# Patient Record
Sex: FEMALE | Race: WHITE | NOT HISPANIC OR LATINO
[De-identification: names, ages, dates, MRNs, and addresses within clinical notes are randomized per-mention and may not be internally consistent; named-entity substitution may affect disease eponyms.]

---

## 2017-07-15 ENCOUNTER — TRANSCRIPTION ENCOUNTER (OUTPATIENT)
Age: 32
End: 2017-07-15

## 2017-12-14 ENCOUNTER — TRANSCRIPTION ENCOUNTER (OUTPATIENT)
Age: 32
End: 2017-12-14

## 2019-10-17 ENCOUNTER — EMERGENCY (EMERGENCY)
Facility: HOSPITAL | Age: 34
LOS: 0 days | Discharge: HOME | End: 2019-10-17
Attending: EMERGENCY MEDICINE | Admitting: EMERGENCY MEDICINE
Payer: COMMERCIAL

## 2019-10-17 VITALS
OXYGEN SATURATION: 100 % | HEART RATE: 75 BPM | RESPIRATION RATE: 18 BRPM | DIASTOLIC BLOOD PRESSURE: 80 MMHG | SYSTOLIC BLOOD PRESSURE: 155 MMHG

## 2019-10-17 VITALS
OXYGEN SATURATION: 100 % | HEART RATE: 81 BPM | DIASTOLIC BLOOD PRESSURE: 90 MMHG | HEIGHT: 62 IN | TEMPERATURE: 98 F | WEIGHT: 164.91 LBS | RESPIRATION RATE: 18 BRPM | SYSTOLIC BLOOD PRESSURE: 160 MMHG

## 2019-10-17 DIAGNOSIS — I10 ESSENTIAL (PRIMARY) HYPERTENSION: ICD-10-CM

## 2019-10-17 DIAGNOSIS — R00.2 PALPITATIONS: ICD-10-CM

## 2019-10-17 LAB
ANION GAP SERPL CALC-SCNC: 13 MMOL/L — SIGNIFICANT CHANGE UP (ref 7–14)
BASOPHILS # BLD AUTO: 0.04 K/UL — SIGNIFICANT CHANGE UP (ref 0–0.2)
BASOPHILS NFR BLD AUTO: 0.6 % — SIGNIFICANT CHANGE UP (ref 0–1)
BUN SERPL-MCNC: 10 MG/DL — SIGNIFICANT CHANGE UP (ref 10–20)
CALCIUM SERPL-MCNC: 9.8 MG/DL — SIGNIFICANT CHANGE UP (ref 8.5–10.1)
CHLORIDE SERPL-SCNC: 102 MMOL/L — SIGNIFICANT CHANGE UP (ref 98–110)
CO2 SERPL-SCNC: 25 MMOL/L — SIGNIFICANT CHANGE UP (ref 17–32)
CREAT SERPL-MCNC: 0.6 MG/DL — LOW (ref 0.7–1.5)
EOSINOPHIL # BLD AUTO: 0.19 K/UL — SIGNIFICANT CHANGE UP (ref 0–0.7)
EOSINOPHIL NFR BLD AUTO: 2.7 % — SIGNIFICANT CHANGE UP (ref 0–8)
GLUCOSE SERPL-MCNC: 108 MG/DL — HIGH (ref 70–99)
HCT VFR BLD CALC: 40.8 % — SIGNIFICANT CHANGE UP (ref 37–47)
HGB BLD-MCNC: 13.1 G/DL — SIGNIFICANT CHANGE UP (ref 12–16)
IMM GRANULOCYTES NFR BLD AUTO: 0.3 % — SIGNIFICANT CHANGE UP (ref 0.1–0.3)
LYMPHOCYTES # BLD AUTO: 2.11 K/UL — SIGNIFICANT CHANGE UP (ref 1.2–3.4)
LYMPHOCYTES # BLD AUTO: 30.1 % — SIGNIFICANT CHANGE UP (ref 20.5–51.1)
MCHC RBC-ENTMCNC: 29.5 PG — SIGNIFICANT CHANGE UP (ref 27–31)
MCHC RBC-ENTMCNC: 32.1 G/DL — SIGNIFICANT CHANGE UP (ref 32–37)
MCV RBC AUTO: 91.9 FL — SIGNIFICANT CHANGE UP (ref 81–99)
MONOCYTES # BLD AUTO: 0.68 K/UL — HIGH (ref 0.1–0.6)
MONOCYTES NFR BLD AUTO: 9.7 % — HIGH (ref 1.7–9.3)
NEUTROPHILS # BLD AUTO: 3.97 K/UL — SIGNIFICANT CHANGE UP (ref 1.4–6.5)
NEUTROPHILS NFR BLD AUTO: 56.6 % — SIGNIFICANT CHANGE UP (ref 42.2–75.2)
NRBC # BLD: 0 /100 WBCS — SIGNIFICANT CHANGE UP (ref 0–0)
PLATELET # BLD AUTO: 256 K/UL — SIGNIFICANT CHANGE UP (ref 130–400)
POTASSIUM SERPL-MCNC: 4.3 MMOL/L — SIGNIFICANT CHANGE UP (ref 3.5–5)
POTASSIUM SERPL-SCNC: 4.3 MMOL/L — SIGNIFICANT CHANGE UP (ref 3.5–5)
RBC # BLD: 4.44 M/UL — SIGNIFICANT CHANGE UP (ref 4.2–5.4)
RBC # FLD: 12.1 % — SIGNIFICANT CHANGE UP (ref 11.5–14.5)
SODIUM SERPL-SCNC: 140 MMOL/L — SIGNIFICANT CHANGE UP (ref 135–146)
WBC # BLD: 7.01 K/UL — SIGNIFICANT CHANGE UP (ref 4.8–10.8)
WBC # FLD AUTO: 7.01 K/UL — SIGNIFICANT CHANGE UP (ref 4.8–10.8)

## 2019-10-17 PROCEDURE — 93010 ELECTROCARDIOGRAM REPORT: CPT

## 2019-10-17 PROCEDURE — 99284 EMERGENCY DEPT VISIT MOD MDM: CPT

## 2019-10-17 NOTE — ED PROVIDER NOTE - PATIENT PORTAL LINK FT
You can access the FollowMyHealth Patient Portal offered by [facility] by registering at the following website: [email or web address]. By joining [employer]’s FollowMyHealth portal, you will also be able to view your health information using other applications (apps) compatible with our system.

## 2019-10-17 NOTE — ED PROVIDER NOTE - NSFOLLOWUPINSTRUCTIONS_ED_ALL_ED_FT
Hypertension    Hypertension, commonly called high blood pressure, is when the force of blood pumping through your arteries is too strong. Hypertension forces your heart to work harder to pump blood. Your arteries may become narrow or stiff. Having untreated or uncontrolled hypertension for a long period of time can cause heart attack, stroke, kidney disease, and other problems. If started on a medication, take exactly as prescribed by your health care professional. Maintain a healthy lifestyle and follow up with your primary care physician.    SEEK IMMEDIATE MEDICAL CARE IF YOU HAVE THE FOLLOWING SYMPTOMS:  confusion, chest pain, abdominal pain, vomiting, or shortness of breath.    Palpitations    A palpitation is the feeling that your heartbeat is irregular or is faster than normal. It may feel like your heart is fluttering or skipping a beat. They may be caused by many things, including smoking, caffeine, alcohol, stress, and certain medicines. Although most causes of palpitations are not serious, palpitations can be a sign of a serious medical problem. Avoid caffeine, alcohol, tobacco products at home. Try to reduce stress and anxiety, and make sure to get plenty of rest.     SEEK IMMEDIATE MEDICAL CARE IF YOU HAVE THE FOLLOWING SYMPTOMS: chest pain, shortness of breath, severe headache, or dizziness/lightheadedness.

## 2019-10-17 NOTE — ED ADULT NURSE NOTE - OBJECTIVE STATEMENT
Pt has had HTN for 3-4 days and palpitations once a night. Pt BP elevated 160/90 at triage. Denies headache, cp. sob.

## 2019-10-17 NOTE — ED PROVIDER NOTE - NS ED ROS FT
Constitutional: No fevers.   Eyes:  No visual changes, eye pain or discharge.  ENMT:  No hearing changes, pain, no sore throat or runny nose, no difficulty swallowing  Cardiac:  No chest pain, SOB or edema. +HTN. +palpitations.   Respiratory:  No cough or respiratory distress. No hemoptysis.   GI:  No nausea, vomiting, diarrhea or abdominal pain.  :  No dysuria, frequency or burning.  MS:  No myalgia, muscle weakness, joint pain or back pain.  Neuro:  No headache or weakness.  No LOC.  Skin:  No skin rash.   Endocrine: No history of thyroid disease or diabetes.

## 2019-10-17 NOTE — ED PROVIDER NOTE - OBJECTIVE STATEMENT
Patient is a 33 yo F w/ no pmh p/w hypertension. Patient states she has had episodes where she becomes flushed and her BP is elevated; prior to arrival BP was 160s systolic at home. Patient also states that for the last 4-5 days she has had brief episodes of palpitations lasting a few seconds before bed; denies chest pain, SOB, abdominal pain, headache, numbness/tingling. Denies fam hx of sudden death; both parents have HTN; no cardiac hx.

## 2019-10-17 NOTE — ED PROVIDER NOTE - PHYSICAL EXAMINATION
CONSTITUTIONAL: Well-developed; well-nourished; in no acute distress.   SKIN: warm, dry.  HEAD: Normocephalic; atraumatic.  EYES: PERRL, EOMI, no conjunctival erythema  ENT: No nasal discharge; airway clear.  NECK: Supple; non tender.  CARD: S1, S2 normal; no murmurs, gallops, or rubs. Regular rate and rhythm.   RESP: No wheezes, rales or rhonchi.  ABD: soft ntnd.  EXT: Normal ROM.  No clubbing, cyanosis or edema. Pulses 2+ in all four extremities.   NEURO: Alert, oriented, grossly unremarkable.  PSYCH: Cooperative, appropriate.

## 2019-10-17 NOTE — ED PROVIDER NOTE - CLINICAL SUMMARY MEDICAL DECISION MAKING FREE TEXT BOX
35yo F no significant past medical history presenting with intermittent palpitations x3-4d, at night while lying down for bed. Also noted to have HTN and has been tracking and following with Dr. Schulte for. No chest pain, no current palpitations. No f/c/n/v/d, abdominal pain, numbness/focal weakness, back pain. labs ekg reviewed. Comfortable with discharge and follow-up outpatient, strict return precautions given. Endorses understanding of all of this and aware that they can return at any time for new or concerning symptoms. No further questions or concerns at this time

## 2019-10-17 NOTE — ED PROVIDER NOTE - CARE PROVIDER_API CALL
Rodney Schulte (DO)  Infectious Disease; Internal Medicine  [address] [place] [address]  Phone: [phone]  Fax: [phone]  Follow Up Time:

## 2019-10-17 NOTE — ED PROVIDER NOTE - ATTENDING CONTRIBUTION TO CARE
33yo F no significant past medical history presenting with intermittent palpitations x3-4d, at night while lying down for bed. Also noted to have HTN and has been tracking and following with Dr. Schulte for. No chest pain, no current palpitations. No f/c/n/v/d, abdominal pain, numbness/focal weakness, back pain   Constitutional: Well appearing. No acute distress. Non toxic.   Eyes: PERRLA. Extraocular movements intact, no entrapment. Conjunctiva normal.   ENT: No nasal discharge. Moist mucus membranes.  Neck: Supple, non tender, full range of motion.  CV: RRR no murmurs, rubs, or gallops. +S1S2.   Pulm: Clear to auscultation bilaterally. Normal work of breathing.  Abd: soft NT ND +BS.   Ext: Warm and well perfused x4, moving all extremities, no edema.   Psy: Cooperative, appropriate.   Skin: Warm, dry, no rash  Neuro: CN2-12 grossly intact no sensory or motor deficits throughout, no drift, no ataxia, rapid alternating within normal limits

## 2019-10-18 ENCOUNTER — OUTPATIENT (OUTPATIENT)
Dept: OUTPATIENT SERVICES | Facility: HOSPITAL | Age: 34
LOS: 1 days | Discharge: HOME | End: 2019-10-18
Payer: COMMERCIAL

## 2019-10-18 DIAGNOSIS — Z12.31 ENCOUNTER FOR SCREENING MAMMOGRAM FOR MALIGNANT NEOPLASM OF BREAST: ICD-10-CM

## 2019-10-18 PROCEDURE — 77063 BREAST TOMOSYNTHESIS BI: CPT | Mod: 26

## 2019-10-18 PROCEDURE — 77067 SCR MAMMO BI INCL CAD: CPT | Mod: 26

## 2019-10-29 ENCOUNTER — OUTPATIENT (OUTPATIENT)
Dept: OUTPATIENT SERVICES | Facility: HOSPITAL | Age: 34
LOS: 1 days | Discharge: HOME | End: 2019-10-29
Payer: COMMERCIAL

## 2019-10-29 DIAGNOSIS — R92.8 OTHER ABNORMAL AND INCONCLUSIVE FINDINGS ON DIAGNOSTIC IMAGING OF BREAST: ICD-10-CM

## 2019-10-29 PROCEDURE — 77065 DX MAMMO INCL CAD UNI: CPT | Mod: 26,RT

## 2019-10-29 PROCEDURE — 76642 ULTRASOUND BREAST LIMITED: CPT | Mod: 26,RT

## 2019-10-29 PROCEDURE — 77061 BREAST TOMOSYNTHESIS UNI: CPT | Mod: 26,RT

## 2019-10-29 PROCEDURE — G0279: CPT | Mod: 26,RT

## 2020-11-24 ENCOUNTER — TRANSCRIPTION ENCOUNTER (OUTPATIENT)
Age: 35
End: 2020-11-24

## 2021-05-12 ENCOUNTER — OUTPATIENT (OUTPATIENT)
Dept: OUTPATIENT SERVICES | Facility: HOSPITAL | Age: 36
LOS: 1 days | Discharge: HOME | End: 2021-05-12
Payer: COMMERCIAL

## 2021-05-12 DIAGNOSIS — Z12.31 ENCOUNTER FOR SCREENING MAMMOGRAM FOR MALIGNANT NEOPLASM OF BREAST: ICD-10-CM

## 2021-05-12 PROCEDURE — 77063 BREAST TOMOSYNTHESIS BI: CPT | Mod: 26

## 2021-05-12 PROCEDURE — 77067 SCR MAMMO BI INCL CAD: CPT | Mod: 26

## 2021-11-30 PROBLEM — Z00.00 ENCOUNTER FOR PREVENTIVE HEALTH EXAMINATION: Status: ACTIVE | Noted: 2021-11-30

## 2021-12-23 ENCOUNTER — TRANSCRIPTION ENCOUNTER (OUTPATIENT)
Age: 36
End: 2021-12-23

## 2021-12-23 ENCOUNTER — APPOINTMENT (OUTPATIENT)
Dept: BREAST CENTER | Facility: CLINIC | Age: 36
End: 2021-12-23
Payer: COMMERCIAL

## 2021-12-23 VITALS
HEIGHT: 62 IN | TEMPERATURE: 96.8 F | DIASTOLIC BLOOD PRESSURE: 80 MMHG | BODY MASS INDEX: 29.08 KG/M2 | WEIGHT: 158 LBS | SYSTOLIC BLOOD PRESSURE: 134 MMHG

## 2021-12-23 DIAGNOSIS — Z80.0 FAMILY HISTORY OF MALIGNANT NEOPLASM OF DIGESTIVE ORGANS: ICD-10-CM

## 2021-12-23 DIAGNOSIS — Z78.9 OTHER SPECIFIED HEALTH STATUS: ICD-10-CM

## 2021-12-23 DIAGNOSIS — Z86.79 PERSONAL HISTORY OF OTHER DISEASES OF THE CIRCULATORY SYSTEM: ICD-10-CM

## 2021-12-23 DIAGNOSIS — Z80.8 FAMILY HISTORY OF MALIGNANT NEOPLASM OF OTHER ORGANS OR SYSTEMS: ICD-10-CM

## 2021-12-23 DIAGNOSIS — Z83.2 FAMILY HISTORY OF DISEASES OF THE BLOOD AND BLOOD-FORMING ORGANS AND CERTAIN DISORDERS INVOLVING THE IMMUNE MECHANISM: ICD-10-CM

## 2021-12-23 PROCEDURE — 99203 OFFICE O/P NEW LOW 30 MIN: CPT

## 2021-12-23 RX ORDER — HYDROCHLOROTHIAZIDE 12.5 MG/1
TABLET ORAL
Refills: 0 | Status: ACTIVE | COMMUNITY

## 2021-12-23 NOTE — ASSESSMENT
[FreeTextEntry1] : Carol Ann is 36 year old female here for initial evaluation; she is s/p B/L reduction mammoplasty procedure on 10/01/2020 - incidentally found focal ALH of the right breast on pathology.\par \par She has no breast related complaints at this time.  She denies any breast pain, has not palpated any new palpable masses in either breast and denies any nipple discharge or retraction.\par She has healed well from surgery.\par \par Her surgical pathology from reduction mammoplasty (10/01/2021):\par Left breast:\par -fragments of benign breast tissue with focally proliferative fibrocystic changes\par -nodular dense stromal fibrosis and focal usual ductal hyperplasia \par -adenosis, duct ectasia and cyst formations.\par Right breast:\par -focal atypical lobular hyperplasia ALH \par -focally proliferative fibrocystic changes with focal ductal hyperplasia \par -nodular dense stromal fibrosis, adenosis and cyst formation \par \par On physical exam, no palpable abnormalities - well healed surgical scars. \par \par In light of her new diagnosis of R breast ALH, her new risk assessment is as follows: \par Ena Model Risk Assessment:\par 5 yr - 1.4%\par Life - 24.9%\par \par Tyrer-Cuzick Risk Assessment (v6):\par 5 yr - 1.9%\par Life - 36.5%\par \par Her pathology results were reviewed.  We discussed atypical lobular hyperplasia (ALH).  These are considered benign high risk lesions. The recommendation is for surgical excision or lumpectomy - which has already been performed.  Patients with these lesions were found to have an increased relative risk of breast cancer, up to four fold compared to the reference population. However the lesions themselves do not have any malignant potential. The upgrade rate to cancer ranges from 0-10% on surgical excision, with pathologic/radiologic concordance, however older studies have documented up to a 60% upgrade rate with lobular neoplasia, including ALH. \par \par This puts her in the high-risk category for breast cancer because she has a lifetime risk greater than 20%.  She qualifies for annual screening MRI, which would be done in an alternating fashion with her screening mammogram such that an imaging study and clinical breast exam would be performed every six months.  The use of MRI has not been shown to prolong survival, however, out of 1000 women screened, an additional 14-15 cancers will be identified.  The use of MRI has, however, been shown to increase the number of procedures and additional imaging because although it is a very sensitive test, it is not as specific.  This was discussed with the patient and she would like to proceed with screening MRI.  She is in agreement with this plan.\par \par PLAN:\par -B/L Dx Mammo - April 2022.\par -f/u after.\par -Plan for possible B/L Breast MRI in October 2022 for high risk screen.

## 2021-12-23 NOTE — DATA REVIEWED
[FreeTextEntry1] : Her surgical pathology from reduction mammoplasty:\par Left breast:\par -fragments of benign breast tissue with focally proliferative fibrocystic changes\par -nodular dense stromal fibrosis and focal usual ductal hyperplasia \par -adenosis, duct ectasia and cyst formations.\par Right breast:\par -focal atypical lobular hyperplasia ALH \par -focally proliferative fibrocystic changes with focal ductal hyperplasia \par -nodular dense stromal fibrosis, adenosis and cyst formation \par

## 2021-12-23 NOTE — PAST MEDICAL HISTORY
[Menstruating] : The patient is menstruating [Menarche Age ____] : age at menarche was [unfilled] [Total Preg ___] : G[unfilled] [Live Births ___] : P[unfilled]  [Age At Live Birth ___] : Age at live birth: [unfilled] [FreeTextEntry6] : No. [FreeTextEntry7] : Yes; approx 15 years. [FreeTextEntry8] : No.

## 2021-12-23 NOTE — PHYSICAL EXAM
[Normocephalic] : normocephalic [Atraumatic] : atraumatic [No Supraclavicular Adenopathy] : no supraclavicular adenopathy [No Cervical Adenopathy] : no cervical adenopathy [Examined in the supine and seated position] : examined in the supine and seated position [No dominant masses] : no dominant masses in right breast  [No dominant masses] : no dominant masses left breast [No Nipple Discharge] : no left nipple discharge [Breast Nipple Inversion] : nipples not inverted [Breast Nipple Retraction] : nipples not retracted [No Axillary Lymphadenopathy] : no left axillary lymphadenopathy [No Rashes] : no rashes [No Ulceration] : no ulceration [de-identified] : well healed surgical scars.

## 2021-12-23 NOTE — REVIEW OF SYSTEMS
[Breast Pain] : no breast pain [Breast Lump] : no breast lump [Nipple Discharge] : no nipple discharge [Nipple Inverted] : no inversion of the nipple [Negative] : Heme/Lymph

## 2021-12-23 NOTE — HISTORY OF PRESENT ILLNESS
[FreeTextEntry1] : Carol Ann is 36 year old female here for initial evaluation; she is s/p B/L reduction mammoplasty procedure on 10/01/2020 - incidentally found focal ALH of the right breast on pathology.\par \par She has no breast related complaints at this time.  She denies any breast pain, has not palpated any new palpable masses in either breast and denies any nipple discharge or retraction.\par She has healed well from surgery.\par \par Her surgical pathology from reduction mammoplasty (10/01/2021):\par Left breast:\par -fragments of benign breast tissue with focally proliferative fibrocystic changes\par -nodular dense stromal fibrosis and focal usual ductal hyperplasia \par -adenosis, duct ectasia and cyst formations.\par Right breast:\par -focal atypical lobular hyperplasia ALH \par -focally proliferative fibrocystic changes with focal ductal hyperplasia \par -nodular dense stromal fibrosis, adenosis and cyst formation \par \par \par HISTORICAL RISK FACTORS:\par -s/p B/L reduction mammoplasty procedure on 10/01/2020.\par -no family hx of breast / ovarian cancer.\par -, age at first live birth was 28.\par -prior OCP use x approx 15 yrs.\par -no gyn surgeries\par \par Ena Model Risk Assessment:\par 5 yr - 1.4%\par Life - 24.9%\par \par Tyrer-Cuzick Risk Assessment (v6):\par 5 yr - 1.9%\par Life - 36.5%\par

## 2022-04-04 ENCOUNTER — RESULT REVIEW (OUTPATIENT)
Age: 37
End: 2022-04-04

## 2022-04-04 ENCOUNTER — OUTPATIENT (OUTPATIENT)
Dept: OUTPATIENT SERVICES | Facility: HOSPITAL | Age: 37
LOS: 1 days | Discharge: HOME | End: 2022-04-04
Payer: COMMERCIAL

## 2022-04-04 DIAGNOSIS — R92.8 OTHER ABNORMAL AND INCONCLUSIVE FINDINGS ON DIAGNOSTIC IMAGING OF BREAST: ICD-10-CM

## 2022-04-04 PROCEDURE — G0279: CPT | Mod: 26

## 2022-04-04 PROCEDURE — 77062 BREAST TOMOSYNTHESIS BI: CPT | Mod: 26

## 2022-04-04 PROCEDURE — 77066 DX MAMMO INCL CAD BI: CPT | Mod: 26

## 2022-04-14 ENCOUNTER — NON-APPOINTMENT (OUTPATIENT)
Age: 37
End: 2022-04-14

## 2022-04-14 ENCOUNTER — APPOINTMENT (OUTPATIENT)
Dept: BREAST CENTER | Facility: CLINIC | Age: 37
End: 2022-04-14
Payer: COMMERCIAL

## 2022-04-14 VITALS
SYSTOLIC BLOOD PRESSURE: 152 MMHG | WEIGHT: 158 LBS | TEMPERATURE: 97.2 F | DIASTOLIC BLOOD PRESSURE: 84 MMHG | BODY MASS INDEX: 29.08 KG/M2 | HEIGHT: 62 IN

## 2022-04-14 PROCEDURE — 99212 OFFICE O/P EST SF 10 MIN: CPT

## 2022-04-14 NOTE — ASSESSMENT
[FreeTextEntry1] : Carol Ann is 36 year old female here for initial evaluation; she is s/p B/L reduction mammoplasty procedure on 10/01/2020 - incidentally found focal ALH of the right breast on pathology.\par \par She has no breast related complaints at this time.  She denies any breast pain, has not palpated any new palpable masses in either breast and denies any nipple discharge or retraction.\par She has healed well from surgery.\par \par Her surgical pathology from reduction mammoplasty (10/01/2021):\par Left breast:\par -fragments of benign breast tissue with focally proliferative fibrocystic changes\par -nodular dense stromal fibrosis and focal usual ductal hyperplasia \par -adenosis, duct ectasia and cyst formations.\par Right breast:\par -focal atypical lobular hyperplasia ALH \par -focally proliferative fibrocystic changes with focal ductal hyperplasia \par -nodular dense stromal fibrosis, adenosis and cyst formation \par \par On physical exam, no palpable abnormalities - well healed surgical scars. \par \par Her imaging is as follows:\par 04/04/2022 - B/L Dx Mammo:\par -The breasts are heterogeneously dense.\par -s/p bilateral breast reduction with architectural distortion both breasts consistent with postsurgical changes. \par -No evidence of malignancy.\par BI-RADS Category 2: Benign\par \par \par In light of her new diagnosis of R breast ALH, her new risk assessment is as follows: \par Ena Model Risk Assessment:\par 5 yr - 1.4%\par Life - 24.9%\par \par Tyrer-Cuzick Risk Assessment (v6):\par 5 yr - 1.9%\par Life - 36.5%\par \par Her pathology results were reviewed.  We discussed atypical lobular hyperplasia (ALH).  These are considered benign high risk lesions. The recommendation is for surgical excision or lumpectomy - which has already been performed.  Patients with these lesions were found to have an increased relative risk of breast cancer, up to four fold compared to the reference population. However the lesions themselves do not have any malignant potential. The upgrade rate to cancer ranges from 0-10% on surgical excision, with pathologic/radiologic concordance, however older studies have documented up to a 60% upgrade rate with lobular neoplasia, including ALH. \par \par This puts her in the high-risk category for breast cancer because she has a lifetime risk greater than 20%.  She qualifies for annual screening MRI, which would be done in an alternating fashion with her screening mammogram such that an imaging study and clinical breast exam would be performed every six months.  The use of MRI has not been shown to prolong survival, however, out of 1000 women screened, an additional 14-15 cancers will be identified.  The use of MRI has, however, been shown to increase the number of procedures and additional imaging because although it is a very sensitive test, it is not as specific.  This was discussed with the patient and she would like to proceed with screening MRI.  She is in agreement with this plan.\par \par PLAN:\par -B/L Breast MRI in October 2022 for high risk screen.\par -f/u after.

## 2022-04-14 NOTE — DATA REVIEWED
[FreeTextEntry1] : ACC: [id number]     EXAM:  MG MAMMO DIAG W JUAN JOSE BI#\par \par PROCEDURE DATE:  04/04/2022\par \par \par \par INTERPRETATION:  Clinical History / Reason for exam: Patient presents for follow-up as she is status post a bilateral breast reduction in October 2021 with atypical ductal hyperplasia identified in the right breast.\par \par The patient reports her last clinical breast examination was performed 6 months ago.\par \par Diagnostic bilateral mammogram including tomography was performed and submitted for evaluation.\par \par Computer-aided detection was utilized in the interpretation of this examination.\par \par Comparison is made to the prior studies dating back to October 2019.\par \par Breast composition:The breasts are heterogeneously dense, which may obscure small masses.\par \par The patient is status post a bilateral breast reduction with architectural distortion both breasts consistent with postsurgical changes. No suspicious masses or abnormal groups of microcalcifications are seen.\par \par Impression: Status post a bilateral breast reduction with architectural distortion of both breasts consistent with postsurgical changes.\par \par No evidence of malignancy.\par \par Recommendation: Unless otherwise indicated by clinical findings, annual screening mammography recommended.\par \par BI-RADS Category 2: Benign\par \par

## 2022-04-14 NOTE — PHYSICAL EXAM
[Normocephalic] : normocephalic [Atraumatic] : atraumatic [No Supraclavicular Adenopathy] : no supraclavicular adenopathy [No Cervical Adenopathy] : no cervical adenopathy [Examined in the supine and seated position] : examined in the supine and seated position [No dominant masses] : no dominant masses in right breast  [No dominant masses] : no dominant masses left breast [No Nipple Discharge] : no left nipple discharge [No Axillary Lymphadenopathy] : no left axillary lymphadenopathy [No Rashes] : no rashes [No Ulceration] : no ulceration [Breast Nipple Inversion] : nipples not inverted [Breast Nipple Retraction] : nipples not retracted [de-identified] : well healed surgical scars.

## 2022-04-14 NOTE — HISTORY OF PRESENT ILLNESS
[FreeTextEntry1] : Carol Ann is 36 year old female here for initial evaluation; she is s/p B/L reduction mammoplasty procedure on 10/01/2020 - incidentally found focal ALH of the right breast on pathology.\par \par She has no breast related complaints at this time.  She denies any breast pain, has not palpated any new palpable masses in either breast and denies any nipple discharge or retraction.\par She has healed well from surgery.\par \par Her surgical pathology from reduction mammoplasty (10/01/2021):\par Left breast:\par -fragments of benign breast tissue with focally proliferative fibrocystic changes\par -nodular dense stromal fibrosis and focal usual ductal hyperplasia \par -adenosis, duct ectasia and cyst formations.\par Right breast:\par -focal atypical lobular hyperplasia ALH \par -focally proliferative fibrocystic changes with focal ductal hyperplasia \par -nodular dense stromal fibrosis, adenosis and cyst formation \par \par \par HISTORICAL RISK FACTORS:\par -s/p B/L reduction mammoplasty procedure on 10/01/2020.\par -no family hx of breast / ovarian cancer.\par -, age at first live birth was 28.\par -prior OCP use x approx 15 yrs.\par -no gyn surgeries\par \par Ena Model Risk Assessment:\par 5 yr - 1.4%\par Life - 24.9%\par \par Tyrer-Cuzick Risk Assessment (v6):\par 5 yr - 1.9%\par Life - 36.5%\par \par \par INTERVAL HISTORY:\par 2022 --\par CAROL ANN AMEZCUA is a 36 year old female patient who presents today in follow up for s/p B/L reduction mammoplasty procedure on 10/01/2020 - incidentally found focal ALH of the right breast on pathology.\par \par \par Her imaging is as follows:\par 2022 - B/L Dx Mammo:\par -The breasts are heterogeneously dense.\par -s/p bilateral breast reduction with architectural distortion both breasts consistent with postsurgical changes. \par -No evidence of malignancy.\par BI-RADS Category 2: Benign\par \par

## 2022-05-13 ENCOUNTER — NON-APPOINTMENT (OUTPATIENT)
Age: 37
End: 2022-05-13

## 2022-10-27 ENCOUNTER — OUTPATIENT (OUTPATIENT)
Dept: OUTPATIENT SERVICES | Facility: HOSPITAL | Age: 37
LOS: 1 days | Discharge: HOME | End: 2022-10-27

## 2022-10-27 ENCOUNTER — RESULT REVIEW (OUTPATIENT)
Age: 37
End: 2022-10-27

## 2022-10-27 DIAGNOSIS — N60.91 UNSPECIFIED BENIGN MAMMARY DYSPLASIA OF RIGHT BREAST: ICD-10-CM

## 2022-10-27 PROCEDURE — 77049 MRI BREAST C-+ W/CAD BI: CPT | Mod: 26

## 2022-11-01 ENCOUNTER — APPOINTMENT (OUTPATIENT)
Dept: BREAST CENTER | Facility: CLINIC | Age: 37
End: 2022-11-01

## 2022-11-01 VITALS
DIASTOLIC BLOOD PRESSURE: 80 MMHG | BODY MASS INDEX: 28.16 KG/M2 | WEIGHT: 153 LBS | SYSTOLIC BLOOD PRESSURE: 148 MMHG | HEIGHT: 62 IN

## 2022-11-01 DIAGNOSIS — R92.8 OTHER ABNORMAL AND INCONCLUSIVE FINDINGS ON DIAGNOSTIC IMAGING OF BREAST: ICD-10-CM

## 2022-11-01 PROCEDURE — 99213 OFFICE O/P EST LOW 20 MIN: CPT

## 2022-11-01 NOTE — HISTORY OF PRESENT ILLNESS
[FreeTextEntry1] : Carol Ann is 36 year old female here for initial evaluation; she is s/p B/L reduction mammoplasty procedure on 10/01/2020 - incidentally found focal ALH of the right breast on pathology.\par \par She has no breast related complaints at this time.  She denies any breast pain, has not palpated any new palpable masses in either breast and denies any nipple discharge or retraction.\par She has healed well from surgery.\par \par Her surgical pathology from reduction mammoplasty (10/01/2021):\par Left breast:\par -fragments of benign breast tissue with focally proliferative fibrocystic changes\par -nodular dense stromal fibrosis and focal usual ductal hyperplasia \par -adenosis, duct ectasia and cyst formations.\par Right breast:\par -focal atypical lobular hyperplasia ALH \par -focally proliferative fibrocystic changes with focal ductal hyperplasia \par -nodular dense stromal fibrosis, adenosis and cyst formation \par \par \par HISTORICAL RISK FACTORS:\par -s/p B/L reduction mammoplasty procedure on 10/01/2020.\par -no family hx of breast / ovarian cancer.\par -, age at first live birth was 28.\par -prior OCP use x approx 15 yrs.\par -no gyn surgeries\par \par Ena Model Risk Assessment:\par 5 yr - 1.4%\par Life - 24.9%\par \par Tyrer-Cuzick Risk Assessment (v6):\par 5 yr - 1.9%\par Life - 36.5%\par \par \par INTERVAL HISTORY:\par 2022 --\par CAROL ANN AMEZCUA is a 36 year old female patient who presents today in follow up for s/p B/L reduction mammoplasty procedure on 10/01/2020 - incidentally found focal ALH of the right breast on pathology.\par \par \par Her imaging is as follows:\par 2022 - B/L Dx Mammo:\par -The breasts are heterogeneously dense.\par -s/p bilateral breast reduction with architectural distortion both breasts consistent with postsurgical changes. \par -No evidence of malignancy.\par BI-RADS Category 2: Benign\par \par INTERVAL HISTORY 22\par Carol Ann is here for her six months follow up visit. She presents with new BIRADS3 abnormality noted on MRI \par She has no breast related complaints at this time.  She denies any breast pain, has not palpated any new palpable masses in either breast and denies any nipple discharge or retraction.\par \par Her imaging is as follows:\par 10/27/2022 b/l breast MRI \par -Heterogeneous fibroglandular tissue.\par \par RIGHT BREAST:\par -Post reduction mammoplasty changes with associated postsurgical change. \par -Small focus of nonmass enhancement along the posterior aspect of the surgical site (series 700/169), likely benign-->MRI follow-up\par - Prominent bilateral axillary lymph nodes, nonspecific and symmetric in appearance.\par \par LEFT BREAST:\par -Post reduction mammoplasty changes in the left breast with associated postoperative seroma. \par -Prominent bilateral axillary lymph nodes, nonspecific and symmetric in appearance.\par BIRADS3\par \par

## 2022-11-01 NOTE — ASSESSMENT
[FreeTextEntry1] : Carol Ann is 37 year old female here for initial evaluation; she is s/p B/L reduction mammoplasty procedure on 10/01/2020 - incidentally found focal ALH of the right breast on pathology. She presents today to discuss new BIRADS 3 abnormality on MRI \par \par She has no breast related complaints at this time.  She denies any breast pain, has not palpated any new palpable masses in either breast and denies any nipple discharge or retraction.\par She has healed well from surgery.\par \par Her surgical pathology from reduction mammoplasty (10/01/2021):\par Left breast:\par -fragments of benign breast tissue with focally proliferative fibrocystic changes\par -nodular dense stromal fibrosis and focal usual ductal hyperplasia \par -adenosis, duct ectasia and cyst formations.\par Right breast:\par -focal atypical lobular hyperplasia ALH \par -focally proliferative fibrocystic changes with focal ductal hyperplasia \par -nodular dense stromal fibrosis, adenosis and cyst formation \par \par On physical exam, no palpable abnormalities - well healed surgical scars. \par \par Her imaging is as follows:\par 10/27/2022 b/l breast MRI \par -Heterogeneous fibroglandular tissue.\par \par RIGHT BREAST:\par -Post reduction mammoplasty changes with associated postsurgical change. \par -Small focus of nonmass enhancement along the posterior aspect of the surgical site (series 700/169), likely benign-->MRI follow-up\par - Prominent bilateral axillary lymph nodes, nonspecific and symmetric in appearance.\par \par LEFT BREAST:\par -Post reduction mammoplasty changes in the left breast with associated postoperative seroma. \par -Prominent bilateral axillary lymph nodes, nonspecific and symmetric in appearance.\par BIRADS3\par \par \par We discussed BIRADS 3 lesions.  These lesions have a 2% chance of harboring malignancy.  There is always an option to obtain a tissue sample with a biopsy to confirm the diagnosis.   The procedure was described in detail including the placement of a tissue marker clip.  The risks of the procedure, including but not limited to bleeding and infection were also explained.  She is not interested in biopsy at this time and agrees to continue with short-term interval imaging. She will call me if she changes her mind and we will schedule her bx. Otherwise, she will be due for MRI in 6 months as well as annual b/l mammo and US in April 2023\par \par \par AS review:\par In light of her new diagnosis of R breast ALH, her new risk assessment is as follows: \par Ena Model Risk Assessment:\par 5 yr - 1.4%\par Life - 24.9%\par \par [facility] (v6[facility]\par 5 yr - 1.9%\par Life - 36.5%\par \par Her pathology results were reviewed.  We discussed atypical lobular hyperplasia (ALH).  These are considered benign high risk lesions. The recommendation is for surgical excision or lumpectomy - which has already been performed.  Patients with these lesions were found to have an increased relative risk of breast cancer, up to four fold compared to the reference population. However the lesions themselves do not have any malignant potential. The upgrade rate to cancer ranges from 0-10% on surgical excision, with pathologic/radiologic concordance, however older studies have documented up to a 60% upgrade rate with lobular neoplasia, including ALH. \par \par This puts her in the high-risk category for breast cancer because she has a lifetime risk greater than 20%.  She qualifies for annual screening MRI, which would be done in an alternating fashion with her screening mammogram such that an imaging study and clinical breast exam would be performed every six months.  The use of MRI has not been shown to prolong survival, however, out of 1000 women screened, an additional 14-15 cancers will be identified.  The use of MRI has, however, been shown to increase the number of procedures and additional imaging because although it is a very sensitive test, it is not as specific.  This was discussed with the patient and she would like to proceed with screening MRI.  She is in agreement with this plan.\par \par PLAN:\par -B/L Breast MRI in April 2023 (BIRADS3)\par -B/L mammo and US on 4/5/23\par -f/u after.

## 2022-11-01 NOTE — DATA REVIEWED
[FreeTextEntry1] : ACC: [id number]     EXAM:  MR BREAST WAW IC BI\par   10/27/2022\par INTERPRETATION:  CLINICAL HISTORY: High-risk screening. Status post bilateral breast reduction with atypical lobular hyperplasia identified in the right breast.\par \par TECHNIQUE: Breast MRI is performed at 1.5 T with the patient prone and the breasts in a dedicated breast coil. Following a 3 plane localizer, sagittal T1 weighted, fat-saturated T1 weighted and fat saturated T2-weighted sequence; dynamic contrast enhanced sagittal images; and delayed post-contrast axial fat-saturated T1 weighted images were obtained. 7.5 mL of Gadavist intravenous contrast were injected and 0 mL was discarded. Subtraction and MIP images were reviewed.\par \par COMPARISON: Mammogram of 4/4/2022.\par \par FINDINGS:\par \par Amount of fibroglandular tissue: Heterogeneous fibroglandular tissue.\par \par Background parenchymal enhancement: Minimal, Symmetric.\par \par RIGHT BREAST:\par Post reduction mammoplasty changes with associated postsurgical change. Small focus of nonmass enhancement along the posterior aspect of the surgical site (series 700/169), likely benign. MRI follow-up is recommended in 6 months to demonstrate stability. No suspicious abnormal enhancement in the right breast. Prominent bilateral axillary lymph nodes, nonspecific and symmetric in appearance.\par \par LEFT BREAST:\par Post reduction mammoplasty changes in the left breast with associated postoperative seroma. No suspicious abnormal enhancement in the left breast. Prominent bilateral axillary lymph nodes, nonspecific and symmetric in appearance.\par \par IMPRESSION:\par \par 1. Probably benign nonmass enhancement in the right breast.\par 2. Postsurgical change in both breasts.\par 3. No MRI evidence of malignancy in the left breast.\par \par Recommendation: Follow-up breast MRI in 6 months.\par \par BI-RADS category 3: Probably Benign\par \par

## 2022-11-01 NOTE — PHYSICAL EXAM
[Normocephalic] : normocephalic [Atraumatic] : atraumatic [EOMI] : extra ocular movement intact [Examined in the supine and seated position] : examined in the supine and seated position [Asymmetrical] : asymmetrical [No dominant masses] : no dominant masses in right breast  [No dominant masses] : no dominant masses left breast [No Nipple Retraction] : no left nipple retraction [No Nipple Discharge] : no left nipple discharge [No Axillary Lymphadenopathy] : no left axillary lymphadenopathy [No Edema] : no edema [No Rashes] : no rashes [No Ulceration] : no ulceration [de-identified] : On physical exam, there are no discrete masses in either breast or axilla. There is no nipple discharge or inversion bilaterally. There are no skin changes bilaterally.\par \par

## 2022-11-22 ENCOUNTER — OUTPATIENT (OUTPATIENT)
Dept: OUTPATIENT SERVICES | Facility: HOSPITAL | Age: 37
LOS: 1 days | Discharge: HOME | End: 2022-11-22

## 2022-11-22 ENCOUNTER — RESULT REVIEW (OUTPATIENT)
Age: 37
End: 2022-11-22

## 2022-11-22 DIAGNOSIS — N60.91 UNSPECIFIED BENIGN MAMMARY DYSPLASIA OF RIGHT BREAST: ICD-10-CM

## 2022-11-22 DIAGNOSIS — R92.8 OTHER ABNORMAL AND INCONCLUSIVE FINDINGS ON DIAGNOSTIC IMAGING OF BREAST: ICD-10-CM

## 2022-11-22 PROCEDURE — 88305 TISSUE EXAM BY PATHOLOGIST: CPT | Mod: 26

## 2022-11-22 PROCEDURE — 77065 DX MAMMO INCL CAD UNI: CPT | Mod: 26,RT

## 2022-11-22 PROCEDURE — 19085 BX BREAST 1ST LESION MR IMAG: CPT | Mod: RT

## 2022-11-23 LAB — SURGICAL PATHOLOGY STUDY: SIGNIFICANT CHANGE UP

## 2022-11-28 ENCOUNTER — NON-APPOINTMENT (OUTPATIENT)
Age: 37
End: 2022-11-28

## 2023-04-07 ENCOUNTER — RESULT REVIEW (OUTPATIENT)
Age: 38
End: 2023-04-07

## 2023-04-07 ENCOUNTER — OUTPATIENT (OUTPATIENT)
Dept: OUTPATIENT SERVICES | Facility: HOSPITAL | Age: 38
LOS: 1 days | End: 2023-04-07
Payer: COMMERCIAL

## 2023-04-07 DIAGNOSIS — R92.8 OTHER ABNORMAL AND INCONCLUSIVE FINDINGS ON DIAGNOSTIC IMAGING OF BREAST: ICD-10-CM

## 2023-04-07 DIAGNOSIS — Z00.8 ENCOUNTER FOR OTHER GENERAL EXAMINATION: ICD-10-CM

## 2023-04-07 PROCEDURE — 76641 ULTRASOUND BREAST COMPLETE: CPT | Mod: 26,50

## 2023-04-07 PROCEDURE — 77067 SCR MAMMO BI INCL CAD: CPT

## 2023-04-07 PROCEDURE — 77063 BREAST TOMOSYNTHESIS BI: CPT

## 2023-04-07 PROCEDURE — 77067 SCR MAMMO BI INCL CAD: CPT | Mod: 26

## 2023-04-07 PROCEDURE — 77063 BREAST TOMOSYNTHESIS BI: CPT | Mod: 26

## 2023-04-07 PROCEDURE — 76641 ULTRASOUND BREAST COMPLETE: CPT | Mod: 50

## 2023-04-08 DIAGNOSIS — R92.8 OTHER ABNORMAL AND INCONCLUSIVE FINDINGS ON DIAGNOSTIC IMAGING OF BREAST: ICD-10-CM

## 2023-04-27 ENCOUNTER — APPOINTMENT (OUTPATIENT)
Dept: BREAST CENTER | Facility: CLINIC | Age: 38
End: 2023-04-27
Payer: COMMERCIAL

## 2023-04-27 VITALS
BODY MASS INDEX: 28.16 KG/M2 | HEIGHT: 62 IN | DIASTOLIC BLOOD PRESSURE: 92 MMHG | SYSTOLIC BLOOD PRESSURE: 143 MMHG | WEIGHT: 153 LBS

## 2023-04-27 PROCEDURE — 99214 OFFICE O/P EST MOD 30 MIN: CPT

## 2023-04-27 NOTE — HISTORY OF PRESENT ILLNESS
[FreeTextEntry1] : Patient is a 37F with history of focal ALH found in right breast on bilateral reduction mammoplasty on 10/1/2020.\par \par HISTORICAL RISK FACTORS:\par -s/p B/L reduction mammoplasty procedure on 10/01/2020.\par -no family hx of breast / ovarian cancer.\par -, age at first live birth was 28.\par -prior OCP use x approx 15 yrs.\par -no gyn surgeries\par \par Ena Model Risk Assessment:\par 5 yr - 1.4%\par Life - 24.9%\par \par Tyrer-Cuzick Risk Assessment (v6):\par 5 yr - 1.9%\par Life - 36.5%\par \par \par Her surgical pathology from reduction mammoplasty (10/01/2021):\par Left breast:\par -fragments of benign breast tissue with focally proliferative fibrocystic changes\par -nodular dense stromal fibrosis and focal usual ductal hyperplasia \par -adenosis, duct ectasia and cyst formations.\par Right breast:\par -focal atypical lobular hyperplasia ALH \par -focally proliferative fibrocystic changes with focal ductal hyperplasia \par -nodular dense stromal fibrosis, adenosis and cyst formation \par \par \par Her imaging is as follows:\par 2022 - B/L Dx Mammo --> BIRADS 2\par -The breasts are heterogeneously dense.\par -s/p bilateral breast reduction with architectural distortion both breasts consistent with postsurgical changes. \par -No evidence of malignancy.\par \par \par 10/27/2022 b/l breast MRI --> BIRADS 3\par -Heterogeneous fibroglandular tissue.\par RIGHT BREAST:\par -Post reduction mammoplasty changes with associated postsurgical change. \par -Small focus of nonmass enhancement along the posterior aspect of the surgical site (series 700/169), likely benign-->MRI follow-up\par - Prominent bilateral axillary lymph nodes, nonspecific and symmetric in appearance.\par LEFT BREAST:\par -Post reduction mammoplasty changes in the left breast with associated postoperative seroma. \par -Prominent bilateral axillary lymph nodes, nonspecific and symmetric in appearance.\par \par Patient wished to undergo MRI guided biopsy.\par \par 22: MRI Bx, R\par Benign fibroadipose connective tissue and fatty breast tissue with a dense fibrous scar associated with remote hemorrhage and chronic lymphohistiocytic cell inflammation including both a multinucleated foreign body type giant cell reaction and hemosiderin-laden macrophages; most consistent with a remote prior biopsy/postsurgical site or another type of remote trauma.\par \par 23: B scg mmg and US --> BIRADS 2 and BIRADS 1\par There are scattered areas of fibroglandular density.\par There are post-surgical changes consistent with prior breast reduction surgeries seen in both breasts.\par Recommend annual screening\par \par Patient denies any current complaints. No new changes to her breasts.

## 2023-04-27 NOTE — PHYSICAL EXAM
[Normocephalic] : normocephalic [EOMI] : extra ocular movement intact [No Supraclavicular Adenopathy] : no supraclavicular adenopathy [No Cervical Adenopathy] : no cervical adenopathy [Examined in the supine and seated position] : examined in the supine and seated position [No dominant masses] : no dominant masses in right breast  [No dominant masses] : no dominant masses left breast [No Nipple Retraction] : no left nipple retraction [No Nipple Discharge] : no left nipple discharge [Breast Nipple Inversion] : nipples not inverted [Breast Nipple Retraction] : nipples not retracted [No Axillary Lymphadenopathy] : no left axillary lymphadenopathy [No Rashes] : no rashes [No Ulceration] : no ulceration [de-identified] : Nl respirations

## 2023-04-27 NOTE — ASSESSMENT
[FreeTextEntry1] : Patient is a 37F with history of incidentally found focal ALH on bilateral breast reduction in 10/2020.  She underwent an MRI in 10/2022 which showed right breast small non mass enhancement along surgical site (BIRADS 3) which the patient wanted biopsied and was found to be benign tissue with fibrous scar (hourglass- clip migrated 1.5cm, concordant).  She then had bilateral scg mmg and US in 4/2023 which showed bilateral post surgical changes (BIRADS 2 and BIRADS 1) with recommendation for annul screening.  We discussed her previous pathology.  We discussed MRI screening. The use of MRIs have not been shown to prolong survival, however out of 1000 women screened, an additional 14-15 cancers will be identified. The use of MRIs, has, however, been shown to increase the number of procedures and additional imaging because although it is a very sensitive test, it is not as specific. I also discussed that some patients could have an allergic reaction to gadolinium, or affect the kidneys, and gadolinium has been found to be deposited in the brain of patients who undergo many MRIs in their lifetime, although the effects are currently understudy. Patient wishes to continue with screening at this time.  We also possible chemoprophylaxis. Referral to be provided.  All questions and concerns were answered in detail.  Patient is for MRI in 10/2023.  She is to follow up after imaging, pending any interval changes.  Total time spent on encounter was greater than 30 minutes , which included face to face time with the patient, performing an exam, reviewing previous medical records, reviewing current imaging/ pathology, documenting in patient record and coordinating care/imaging. Greater than 50% of the encounter was spent on counseling and coordination of her breast issue.

## 2023-06-23 ENCOUNTER — OUTPATIENT (OUTPATIENT)
Dept: OUTPATIENT SERVICES | Facility: HOSPITAL | Age: 38
LOS: 1 days | End: 2023-06-23
Payer: COMMERCIAL

## 2023-06-23 DIAGNOSIS — Z00.8 ENCOUNTER FOR OTHER GENERAL EXAMINATION: ICD-10-CM

## 2023-06-23 DIAGNOSIS — R10.2 PELVIC AND PERINEAL PAIN: ICD-10-CM

## 2023-06-23 PROCEDURE — 76830 TRANSVAGINAL US NON-OB: CPT | Mod: 26

## 2023-06-23 PROCEDURE — 76856 US EXAM PELVIC COMPLETE: CPT | Mod: 26,59

## 2023-06-23 PROCEDURE — 76856 US EXAM PELVIC COMPLETE: CPT

## 2023-06-23 PROCEDURE — 76830 TRANSVAGINAL US NON-OB: CPT

## 2023-06-24 DIAGNOSIS — R10.2 PELVIC AND PERINEAL PAIN: ICD-10-CM

## 2023-10-26 ENCOUNTER — APPOINTMENT (OUTPATIENT)
Dept: BREAST CENTER | Facility: CLINIC | Age: 38
End: 2023-10-26

## 2023-12-13 ENCOUNTER — OUTPATIENT (OUTPATIENT)
Dept: OUTPATIENT SERVICES | Facility: HOSPITAL | Age: 38
LOS: 1 days | End: 2023-12-13
Payer: COMMERCIAL

## 2023-12-13 ENCOUNTER — RESULT REVIEW (OUTPATIENT)
Age: 38
End: 2023-12-13

## 2023-12-13 DIAGNOSIS — Z12.39 ENCOUNTER FOR OTHER SCREENING FOR MALIGNANT NEOPLASM OF BREAST: ICD-10-CM

## 2023-12-13 DIAGNOSIS — N63.0 UNSPECIFIED LUMP IN UNSPECIFIED BREAST: ICD-10-CM

## 2023-12-13 PROCEDURE — 77049 MRI BREAST C-+ W/CAD BI: CPT | Mod: 26

## 2023-12-13 PROCEDURE — A9579: CPT

## 2023-12-13 PROCEDURE — C8937: CPT

## 2023-12-13 PROCEDURE — 77049 MRI BREAST C-+ W/CAD BI: CPT

## 2023-12-14 DIAGNOSIS — N63.0 UNSPECIFIED LUMP IN UNSPECIFIED BREAST: ICD-10-CM

## 2024-02-06 ENCOUNTER — APPOINTMENT (OUTPATIENT)
Dept: BREAST CENTER | Facility: CLINIC | Age: 39
End: 2024-02-06
Payer: COMMERCIAL

## 2024-02-06 VITALS
SYSTOLIC BLOOD PRESSURE: 148 MMHG | HEIGHT: 62 IN | WEIGHT: 156 LBS | BODY MASS INDEX: 28.71 KG/M2 | DIASTOLIC BLOOD PRESSURE: 94 MMHG

## 2024-02-06 PROCEDURE — 99214 OFFICE O/P EST MOD 30 MIN: CPT

## 2024-02-06 NOTE — PHYSICAL EXAM
[Normocephalic] : normocephalic [EOMI] : extra ocular movement intact [No Supraclavicular Adenopathy] : no supraclavicular adenopathy [No Cervical Adenopathy] : no cervical adenopathy [Examined in the supine and seated position] : examined in the supine and seated position [No dominant masses] : no dominant masses in right breast  [No dominant masses] : no dominant masses left breast [No Nipple Retraction] : no left nipple retraction [No Nipple Discharge] : no left nipple discharge [No Axillary Lymphadenopathy] : no left axillary lymphadenopathy [No Rashes] : no rashes [No Ulceration] : no ulceration [de-identified] : Nl respirations

## 2024-02-06 NOTE — HISTORY OF PRESENT ILLNESS
[FreeTextEntry1] : Patient is a 38F with history of focal ALH found in right breast on bilateral reduction mammoplasty on 10/1/2020.  HISTORICAL RISK FACTORS: -s/p B/L reduction mammoplasty procedure on 10/01/2020. -no family hx of breast / ovarian cancer. -, age at first live birth was 28. -prior OCP use x approx 15 yrs. -no gyn surgeries  Ena Model Risk Assessment: 5 yr - 1.4% Life - 24.9%  Tyrer-Cuzick Risk Assessment (v6): 5 yr - 1.9% Life - 36.5%   Her surgical pathology from reduction mammoplasty (10/01/2021): Left breast: -fragments of benign breast tissue with focally proliferative fibrocystic changes -nodular dense stromal fibrosis and focal usual ductal hyperplasia  -adenosis, duct ectasia and cyst formations. Right breast: -focal atypical lobular hyperplasia ALH  -focally proliferative fibrocystic changes with focal ductal hyperplasia  -nodular dense stromal fibrosis, adenosis and cyst formation    Her imaging is as follows: 2022 - B/L Dx Mammo --> BIRADS 2 -The breasts are heterogeneously dense. -s/p bilateral breast reduction with architectural distortion both breasts consistent with postsurgical changes.  -No evidence of malignancy.   10/27/2022 b/l breast MRI --> BIRADS 3 -Heterogeneous fibroglandular tissue. RIGHT BREAST: -Post reduction mammoplasty changes with associated postsurgical change.  -Small focus of nonmass enhancement along the posterior aspect of the surgical site (series 700/169), likely benign-->MRI follow-up - Prominent bilateral axillary lymph nodes, nonspecific and symmetric in appearance. LEFT BREAST: -Post reduction mammoplasty changes in the left breast with associated postoperative seroma.  -Prominent bilateral axillary lymph nodes, nonspecific and symmetric in appearance.  Patient wished to undergo MRI guided biopsy.  22: MRI Bx, R Benign fibroadipose connective tissue and fatty breast tissue with a dense fibrous scar associated with remote hemorrhage and chronic lymphohistiocytic cell inflammation including both a multinucleated foreign body type giant cell reaction and hemosiderin-laden macrophages; most consistent with a remote prior biopsy/postsurgical site or another type of remote trauma.  23: B scg mmg and US --> BIRADS 2 and BIRADS 1 There are scattered areas of fibroglandular density. There are post-surgical changes consistent with prior breast reduction surgeries seen in both breasts. Recommend annual screening  2023 b/l MRI-->BIRADS2 Heterogeneous fibroglandular tissue RIGHT BREAST: Post reduction mammoplasty changes with associated postsurgical change. Post benign biopsy of small focus of nonmass enhancement along the posterior aspect of the surgical site ().  Prominent bilateral axillary lymph nodes, nonspecific and symmetric in appearance. LEFT BREAST: Post reduction mammoplasty changes in the left breast with associated postoperative seroma, decreased in size  Prominent bilateral axillary lymph nodes, nonspecific and symmetric in appearance.  Denies any acute complaints. No new changes to her breasts. Occassionally has cyclical right breast pain correlating with her menstrual cycle.

## 2024-02-06 NOTE — DATA REVIEWED
[FreeTextEntry1] : : [id number]     EXAM:  MR BREAST WAWIC BI W CAD#   ORDERED BY: MARISOL AGUILERAEMIL  PROCEDURE DATE:  12/13/2023    INTERPRETATION:  CLINICAL HISTORY: High-risk screening. Status post right nonmass enhancement biopsy and bilateral breast reduction with atypical lobular hyperplasia identified in the right breast.  TECHNIQUE: Breast MRI is performed at 1.5 T with the patient prone and the breasts in a dedicated breast coil. Following a 3 plane localizer, sagittal T1 weighted, fat-saturated T1 weighted and fat saturated T2-weighted sequence; dynamic contrast enhanced sagittal images; and delayed post-contrast axial fat-saturated T1 weighted images were obtained. 7 mL of Gadavist intravenous contrast were injected and 0.5 mL was discarded. Subtraction and MIP images were reviewed. Gociety was utilized.  COMPARISON: MR breast 10/27/2022, 11/22/2022.  FINDINGS:  Amount of fibroglandular tissue: Heterogeneous fibroglandular tissue.  Background parenchymal enhancement: Minimal, Symmetric.  RIGHT BREAST: Post reduction mammoplasty changes with associated postsurgical change. Post benign biopsy of small focus of nonmass enhancement along the posterior aspect of the surgical site (8/36). No suspicious abnormal enhancement in the right breast. Prominent bilateral axillary lymph nodes, nonspecific and symmetric in appearance.  LEFT BREAST: Post reduction mammoplasty changes in the left breast with associated postoperative seroma, decreased in size. No suspicious abnormal enhancement in the left breast. Prominent bilateral axillary lymph nodes, nonspecific and symmetric in appearance.   IMPRESSION:   No MRI evidence of malignancy in either breast.  Post biopsy of nonmass enhancement in the right breast, benign.  Postsurgical changes in both breasts.  Prominent bilateral axillary lymph nodes, nonspecific and symmetric in appearance.  These are overall unchanged in size.  Recommendation: Unless otherwise indicated by clinical findings, annual screening mammography recommended.  BI-RADS Category 2: Benign

## 2024-02-06 NOTE — ASSESSMENT
[FreeTextEntry1] : Patient is a 38F with history of incidentally found focal ALH on bilateral breast reduction in 10/2020.  She underwent an MRI in 10/2022 which showed right breast small non mass enhancement along surgical site (BIRADS 3) which the patient wanted biopsied and was found to be benign tissue with fibrous scar (hourglass- clip migrated 1.5cm, concordant).  She then had bilateral scg mmg and US in 4/2023 which showed bilateral post surgical changes (BIRADS 2 and BIRADS 1) with recommendation for annual screening.  She underwent an MRI in 12/2023 which showed post surgical changes (BIRADS 2).  She is not interested in chemoppx.  We discussed her imaging in detail. We discussed it was benign.  We discussed breast density. Increasing breast density has been found to increase ones risk of breast cancer, but at this time, there is no clear indication for additional imaging in this setting, as both US and MRI have not been found to improve survival. One can consider bilateral screening US. However, out of 1000 women screened, the use of routine US will only identify an additional 3-5 cancers. The use of US was found to increase the likelihood of undergoing more imaging and more biopsies. She does not have dense breasts as per her most recent imaging but wishes to undergo screening ultrasounds.  All questions and concerns were answered in detail.  Patient is for bilateral mmg/us in 4/2024.  She is to follow up after imaging, pending any interval changes.  Total time spent on encounter was greater than 30 minutes , which included face to face time with the patient, performing an exam, reviewing previous medical records, reviewing current imaging/ pathology, documenting in patient record and coordinating care/imaging. Greater than 50% of the encounter was spent on counseling and coordination of her breast issue.

## 2024-04-12 ENCOUNTER — RESULT REVIEW (OUTPATIENT)
Age: 39
End: 2024-04-12

## 2024-04-12 ENCOUNTER — OUTPATIENT (OUTPATIENT)
Dept: OUTPATIENT SERVICES | Facility: HOSPITAL | Age: 39
LOS: 1 days | End: 2024-04-12
Payer: COMMERCIAL

## 2024-04-12 DIAGNOSIS — N60.91 UNSPECIFIED BENIGN MAMMARY DYSPLASIA OF RIGHT BREAST: ICD-10-CM

## 2024-04-12 DIAGNOSIS — Z12.39 ENCOUNTER FOR OTHER SCREENING FOR MALIGNANT NEOPLASM OF BREAST: ICD-10-CM

## 2024-04-12 DIAGNOSIS — Z12.31 ENCOUNTER FOR SCREENING MAMMOGRAM FOR MALIGNANT NEOPLASM OF BREAST: ICD-10-CM

## 2024-04-12 DIAGNOSIS — R92.2 INCONCLUSIVE MAMMOGRAM: ICD-10-CM

## 2024-04-12 PROCEDURE — 77067 SCR MAMMO BI INCL CAD: CPT | Mod: 26

## 2024-04-12 PROCEDURE — 77063 BREAST TOMOSYNTHESIS BI: CPT | Mod: 26

## 2024-04-12 PROCEDURE — 76641 ULTRASOUND BREAST COMPLETE: CPT | Mod: 26,50

## 2024-04-12 PROCEDURE — 76641 ULTRASOUND BREAST COMPLETE: CPT | Mod: 50

## 2024-04-12 PROCEDURE — 77067 SCR MAMMO BI INCL CAD: CPT

## 2024-04-12 PROCEDURE — 77063 BREAST TOMOSYNTHESIS BI: CPT

## 2024-04-13 DIAGNOSIS — Z12.31 ENCOUNTER FOR SCREENING MAMMOGRAM FOR MALIGNANT NEOPLASM OF BREAST: ICD-10-CM

## 2024-04-13 DIAGNOSIS — R92.2 INCONCLUSIVE MAMMOGRAM: ICD-10-CM

## 2024-04-19 ENCOUNTER — APPOINTMENT (OUTPATIENT)
Dept: BREAST CENTER | Facility: CLINIC | Age: 39
End: 2024-04-19
Payer: COMMERCIAL

## 2024-04-19 VITALS
SYSTOLIC BLOOD PRESSURE: 148 MMHG | WEIGHT: 153 LBS | BODY MASS INDEX: 28.16 KG/M2 | DIASTOLIC BLOOD PRESSURE: 96 MMHG | HEART RATE: 82 BPM | HEIGHT: 62 IN

## 2024-04-19 DIAGNOSIS — N60.91 UNSPECIFIED BENIGN MAMMARY DYSPLASIA OF RIGHT BREAST: ICD-10-CM

## 2024-04-19 DIAGNOSIS — Z12.39 ENCOUNTER FOR OTHER SCREENING FOR MALIGNANT NEOPLASM OF BREAST: ICD-10-CM

## 2024-04-19 PROCEDURE — 99213 OFFICE O/P EST LOW 20 MIN: CPT

## 2024-05-13 ENCOUNTER — NON-APPOINTMENT (OUTPATIENT)
Age: 39
End: 2024-05-13

## 2024-09-12 ENCOUNTER — OUTPATIENT (OUTPATIENT)
Dept: OUTPATIENT SERVICES | Facility: HOSPITAL | Age: 39
LOS: 1 days | End: 2024-09-12
Payer: COMMERCIAL

## 2024-09-12 DIAGNOSIS — R10.2 PELVIC AND PERINEAL PAIN: ICD-10-CM

## 2024-09-12 DIAGNOSIS — Z00.8 ENCOUNTER FOR OTHER GENERAL EXAMINATION: ICD-10-CM

## 2024-09-12 PROCEDURE — 76830 TRANSVAGINAL US NON-OB: CPT

## 2024-09-12 PROCEDURE — 76830 TRANSVAGINAL US NON-OB: CPT | Mod: 26

## 2024-09-12 PROCEDURE — 76856 US EXAM PELVIC COMPLETE: CPT | Mod: 26,59

## 2024-09-12 PROCEDURE — 76856 US EXAM PELVIC COMPLETE: CPT

## 2024-09-13 DIAGNOSIS — R10.2 PELVIC AND PERINEAL PAIN: ICD-10-CM

## 2024-10-21 NOTE — ED ADULT NURSE NOTE - CHPI ED NUR DURATION
Working DM Lab Report.     Pt has lab appt scheduled 10/23/24.  All needed lab scheduled.       4/day(s)

## 2025-01-02 ENCOUNTER — OUTPATIENT (OUTPATIENT)
Dept: OUTPATIENT SERVICES | Facility: HOSPITAL | Age: 40
LOS: 1 days | End: 2025-01-02
Payer: COMMERCIAL

## 2025-01-02 ENCOUNTER — RESULT REVIEW (OUTPATIENT)
Age: 40
End: 2025-01-02

## 2025-01-02 DIAGNOSIS — N60.91 UNSPECIFIED BENIGN MAMMARY DYSPLASIA OF RIGHT BREAST: ICD-10-CM

## 2025-01-02 DIAGNOSIS — Z00.8 ENCOUNTER FOR OTHER GENERAL EXAMINATION: ICD-10-CM

## 2025-01-02 DIAGNOSIS — Z12.39 ENCOUNTER FOR OTHER SCREENING FOR MALIGNANT NEOPLASM OF BREAST: ICD-10-CM

## 2025-01-02 PROCEDURE — 77049 MRI BREAST C-+ W/CAD BI: CPT

## 2025-01-02 PROCEDURE — 77049 MRI BREAST C-+ W/CAD BI: CPT | Mod: 26

## 2025-01-02 PROCEDURE — A9579: CPT

## 2025-01-02 PROCEDURE — C8937: CPT

## 2025-01-03 DIAGNOSIS — Z12.39 ENCOUNTER FOR OTHER SCREENING FOR MALIGNANT NEOPLASM OF BREAST: ICD-10-CM

## 2025-01-03 DIAGNOSIS — N60.91 UNSPECIFIED BENIGN MAMMARY DYSPLASIA OF RIGHT BREAST: ICD-10-CM

## 2025-01-10 ENCOUNTER — APPOINTMENT (OUTPATIENT)
Dept: BREAST CENTER | Facility: CLINIC | Age: 40
End: 2025-01-10
Payer: COMMERCIAL

## 2025-01-10 VITALS
WEIGHT: 153 LBS | HEART RATE: 79 BPM | BODY MASS INDEX: 28.16 KG/M2 | SYSTOLIC BLOOD PRESSURE: 173 MMHG | HEIGHT: 62 IN | DIASTOLIC BLOOD PRESSURE: 100 MMHG

## 2025-01-10 DIAGNOSIS — N60.91 UNSPECIFIED BENIGN MAMMARY DYSPLASIA OF RIGHT BREAST: ICD-10-CM

## 2025-01-10 DIAGNOSIS — Z12.39 ENCOUNTER FOR OTHER SCREENING FOR MALIGNANT NEOPLASM OF BREAST: ICD-10-CM

## 2025-01-10 DIAGNOSIS — R92.8 OTHER ABNORMAL AND INCONCLUSIVE FINDINGS ON DIAGNOSTIC IMAGING OF BREAST: ICD-10-CM

## 2025-01-10 PROCEDURE — 99213 OFFICE O/P EST LOW 20 MIN: CPT

## 2025-01-26 ENCOUNTER — OUTPATIENT (OUTPATIENT)
Dept: OUTPATIENT SERVICES | Facility: HOSPITAL | Age: 40
LOS: 1 days | End: 2025-01-26
Payer: COMMERCIAL

## 2025-01-26 DIAGNOSIS — R92.8 OTHER ABNORMAL AND INCONCLUSIVE FINDINGS ON DIAGNOSTIC IMAGING OF BREAST: ICD-10-CM

## 2025-01-26 PROCEDURE — 71250 CT THORAX DX C-: CPT | Mod: 26

## 2025-01-26 PROCEDURE — 71250 CT THORAX DX C-: CPT

## 2025-01-27 DIAGNOSIS — R92.8 OTHER ABNORMAL AND INCONCLUSIVE FINDINGS ON DIAGNOSTIC IMAGING OF BREAST: ICD-10-CM

## 2025-04-17 ENCOUNTER — RESULT REVIEW (OUTPATIENT)
Age: 40
End: 2025-04-17

## 2025-04-17 ENCOUNTER — OUTPATIENT (OUTPATIENT)
Dept: OUTPATIENT SERVICES | Facility: HOSPITAL | Age: 40
LOS: 1 days | End: 2025-04-17
Payer: COMMERCIAL

## 2025-04-17 DIAGNOSIS — Z12.39 ENCOUNTER FOR OTHER SCREENING FOR MALIGNANT NEOPLASM OF BREAST: ICD-10-CM

## 2025-04-17 DIAGNOSIS — Z12.31 ENCOUNTER FOR SCREENING MAMMOGRAM FOR MALIGNANT NEOPLASM OF BREAST: ICD-10-CM

## 2025-04-17 DIAGNOSIS — N60.91 UNSPECIFIED BENIGN MAMMARY DYSPLASIA OF RIGHT BREAST: ICD-10-CM

## 2025-04-17 PROCEDURE — 77067 SCR MAMMO BI INCL CAD: CPT | Mod: 26

## 2025-04-17 PROCEDURE — 77063 BREAST TOMOSYNTHESIS BI: CPT

## 2025-04-17 PROCEDURE — 77067 SCR MAMMO BI INCL CAD: CPT

## 2025-04-17 PROCEDURE — 76641 ULTRASOUND BREAST COMPLETE: CPT | Mod: 26,50

## 2025-04-17 PROCEDURE — 77063 BREAST TOMOSYNTHESIS BI: CPT | Mod: 26

## 2025-04-17 PROCEDURE — 76641 ULTRASOUND BREAST COMPLETE: CPT | Mod: 50

## 2025-04-18 DIAGNOSIS — Z12.39 ENCOUNTER FOR OTHER SCREENING FOR MALIGNANT NEOPLASM OF BREAST: ICD-10-CM

## 2025-04-18 DIAGNOSIS — Z12.31 ENCOUNTER FOR SCREENING MAMMOGRAM FOR MALIGNANT NEOPLASM OF BREAST: ICD-10-CM

## 2025-04-25 ENCOUNTER — APPOINTMENT (OUTPATIENT)
Dept: BREAST CENTER | Facility: CLINIC | Age: 40
End: 2025-04-25
Payer: COMMERCIAL

## 2025-04-25 VITALS — WEIGHT: 147 LBS | BODY MASS INDEX: 27.05 KG/M2 | HEIGHT: 62 IN

## 2025-04-25 DIAGNOSIS — Z12.39 ENCOUNTER FOR OTHER SCREENING FOR MALIGNANT NEOPLASM OF BREAST: ICD-10-CM

## 2025-04-25 DIAGNOSIS — N60.91 UNSPECIFIED BENIGN MAMMARY DYSPLASIA OF RIGHT BREAST: ICD-10-CM

## 2025-04-25 PROCEDURE — 99213 OFFICE O/P EST LOW 20 MIN: CPT
